# Patient Record
Sex: MALE | Race: WHITE
[De-identification: names, ages, dates, MRNs, and addresses within clinical notes are randomized per-mention and may not be internally consistent; named-entity substitution may affect disease eponyms.]

---

## 2019-06-19 ENCOUNTER — HOSPITAL ENCOUNTER (OUTPATIENT)
Dept: HOSPITAL 43 - DL.SDS | Age: 79
Discharge: HOME | End: 2019-06-19
Attending: OPHTHALMOLOGY
Payer: MEDICARE

## 2019-06-19 DIAGNOSIS — G47.30: ICD-10-CM

## 2019-06-19 DIAGNOSIS — E11.36: Primary | ICD-10-CM

## 2019-06-19 DIAGNOSIS — Z79.82: ICD-10-CM

## 2019-06-19 DIAGNOSIS — E78.5: ICD-10-CM

## 2019-06-19 DIAGNOSIS — Z79.899: ICD-10-CM

## 2019-06-19 DIAGNOSIS — N18.9: ICD-10-CM

## 2019-06-19 DIAGNOSIS — Z87.891: ICD-10-CM

## 2019-06-19 DIAGNOSIS — Z79.84: ICD-10-CM

## 2019-06-19 DIAGNOSIS — E11.22: ICD-10-CM

## 2019-06-19 DIAGNOSIS — I12.9: ICD-10-CM

## 2019-06-19 DIAGNOSIS — J44.9: ICD-10-CM

## 2019-06-19 DIAGNOSIS — E66.9: ICD-10-CM

## 2019-06-19 PROCEDURE — V2632 POST CHMBR INTRAOCULAR LENS: HCPCS

## 2019-06-19 RX ADMIN — SODIUM CHLORIDE ONE MG: 9 INJECTION, SOLUTION INTRAVENOUS at 11:31

## 2019-06-19 RX ADMIN — MOXIFLOXACIN HYDROCHLORIDE ONE DROP: 5 SOLUTION/ DROPS OPHTHALMIC at 10:56

## 2019-06-19 RX ADMIN — DICLOFENAC SODIUM ONE ML: 1 SOLUTION/ DROPS OPHTHALMIC at 11:31

## 2019-06-19 RX ADMIN — Medication ONE ML: at 11:31

## 2019-06-19 RX ADMIN — PHENYLEPHRINE HYDROCHLORIDE ONE DROP: 100 SOLUTION/ DROPS OPHTHALMIC at 10:57

## 2019-06-19 RX ADMIN — POVIDONE-IODINE ONE ML: 5 SOLUTION OPHTHALMIC at 11:31

## 2019-06-19 RX ADMIN — BALANCED SALT SOLUTION ONE ML: 6.4; .75; .48; .3; 3.9; 1.7 SOLUTION OPHTHALMIC at 11:31

## 2019-06-19 RX ADMIN — TIMOLOL MALEATE ONE DROP: 5 SOLUTION OPHTHALMIC at 10:58

## 2019-06-19 RX ADMIN — NEOMYCIN AND POLYMYXIN B SULFATES AND DEXAMETHASONE ONE GM: 3.5; 10000; 1 OINTMENT OPHTHALMIC at 11:31

## 2019-06-19 RX ADMIN — APRACLONIDINE HYDROCHLORIDE ONE ML: 5 SOLUTION/ DROPS OPHTHALMIC at 11:31

## 2019-06-19 RX ADMIN — TETRACAINE HYDROCHLORIDE ONE ML: 5 SOLUTION OPHTHALMIC at 11:31

## 2019-06-19 RX ADMIN — POVIDONE-IODINE ONE ML: 5 SOLUTION OPHTHALMIC at 10:55

## 2019-06-19 RX ADMIN — Medication PRN ML: at 11:01

## 2019-06-19 NOTE — OR
DATE:  06/19/2019

 

PREOPERATIVE DIAGNOSIS:  Visually significant mixed cataract, left eye.

 

POSTOPERATIVE DIAGNOSIS:  Visually significant mixed cataract, left eye.

 

PROCEDURE:  Extracapsular cataract extraction with intraocular lens implant,

left eye.

 

ANESTHESIA:  Topical/local MAC.

 

COMPLICATIONS:  None.

 

INDICATION:  Mr. White was seen in the clinic with complaints of blurred

vision.  His examination revealed visually significant cataract.  I explained

options, I offered cataract surgery, and I explained risks preoperatively

including, but not limited to, infection, retinal detachment, loss of vision,

need for additional surgery, amongst others.  We discussed implant options.  He

has requested a monofocal implant.

 

OPERATIVE DESCRIPTION:  After informed consent was obtained and the risks,

benefits, and alternatives were explained, the patient was brought to the

operative suite and topical anesthesia was administered.  The patient was then

prepped and draped in the sterile fashion and attention was placed on the left

eye.  A sterile lid speculum was placed into the left eye to allow operative

exposure. A full-thickness paracentesis was made in the temporal portion of the

operative eye. Preservative-free lidocaine 0.1 mL was injected into the anterior

chamber followed by viscoelastic. A full-thickness corneal incision was then

made into the anterior chamber.  A bent needle cystotome was used to create a

small nick in the anterior capsule. The capsulorrhexis forceps was then used to

create a 360-degree curvilinear capsulorrhexis.  The nucleus was then removed

using a phacoemulsification handpiece and the remaining cortical material was

then removed with irrigation and aspiration handpiece. Following removal of the

cortical material, the capsular bag was then inspected and noted to be free of

any holes or tears. Viscoelastic was then injected into the capsular bag and the

intraocular lens was inserted into the capsular bag. The viscoelastic material

was then removed from both the anterior and posterior chambers and from behind

the IOL. The lens and capsular bag were then reinspected. The IOL was well

centered and the capsular bag intact. The wound and paracentesis sites were

inspected and hydrated with balanced saline solution. Both were found to be self-

sealing. The intraocular pressure was assessed digitally and found to be within

normal range. A good red reflex was noted at the completion of the procedure. No

complications occurred during the operation. At the completion of the procedure,

Maxitrol, Voltaren, and Iopidine drops were placed into the operative eye. A

sterile eye shield was placed over the operative eye and the patient was

transported to the postoperative recovery area having tolerated the procedure

well. Postoperative instructions were given along with a postoperative

appointment.  The patient was advised to call with any questions or concerns.

 

DD:  06/19/2019 11:42:37

DT:  06/19/2019 13:25:27

[facility]

Job #:  [id number]

## 2019-06-26 ENCOUNTER — HOSPITAL ENCOUNTER (OUTPATIENT)
Dept: HOSPITAL 43 - DL.SDS | Age: 79
Discharge: HOME | End: 2019-06-26
Attending: OPHTHALMOLOGY
Payer: MEDICARE

## 2019-06-26 DIAGNOSIS — N18.9: ICD-10-CM

## 2019-06-26 DIAGNOSIS — Z79.899: ICD-10-CM

## 2019-06-26 DIAGNOSIS — Z96.1: ICD-10-CM

## 2019-06-26 DIAGNOSIS — Z79.82: ICD-10-CM

## 2019-06-26 DIAGNOSIS — E11.36: Primary | ICD-10-CM

## 2019-06-26 DIAGNOSIS — E66.9: ICD-10-CM

## 2019-06-26 DIAGNOSIS — E78.5: ICD-10-CM

## 2019-06-26 DIAGNOSIS — I12.9: ICD-10-CM

## 2019-06-26 DIAGNOSIS — E11.22: ICD-10-CM

## 2019-06-26 DIAGNOSIS — Z87.891: ICD-10-CM

## 2019-06-26 PROCEDURE — C1780 LENS, INTRAOCULAR (NEW TECH): HCPCS

## 2019-06-26 NOTE — OR
DATE:  06/26/2019

 

PREOPERATIVE DIAGNOSIS:  Visually significant mixed cataract, right eye.

 

POSTOPERATIVE DIAGNOSIS:  Visually significant mixed cataract, right eye.

 

PROCEDURE:  Extracapsular cataract extraction with intraocular lens implant,

right eye.

 

ANESTHESIA:  Topical/local MAC.

 

COMPLICATIONS:  None.

 

INDICATION:  Mr. White was seen in the clinic.  He has complained of difficulty

driving, difficulty reading, difficulty seeing road signs.  He also has

difficulty seeing the sights on his rifle.  Examination reveals mixed cataract.

I explained options, offered cataract surgery, and I explained risks including,

but not limited to, infection, retinal detachment, loss of vision, need for

additional surgery, amongst others.  We discussed implant options.  He has

requested surgery with a monofocal implant.

 

OPERATIVE DESCRIPTION:  After informed consent was obtained and the risks,

benefits, and alternatives were explained, the patient was brought to the

operative suite and topical anesthesia was administered.  The patient was then

prepped and draped in the sterile fashion and attention was placed on the right

eye.  A sterile lid speculum was placed into the right eye to allow operative

exposure. A full-thickness paracentesis was made in the temporal portion of the

operative eye. Preservative-free lidocaine 0.1 mL was injected into the anterior

chamber followed by viscoelastic. A full-thickness corneal incision was then

made into the anterior chamber.  A bent needle cystotome was used to create a

small nick in the anterior capsule. The capsulorrhexis forceps was then used to

create a 360-degree curvilinear capsulorrhexis.  The nucleus was then removed

using a phacoemulsification handpiece and the remaining cortical material was

then removed with irrigation and aspiration handpiece. Following removal of the

cortical material, the capsular bag was then inspected and noted to be free of

any holes or tears. Viscoelastic was then injected into the capsular bag and the

intraocular lens was inserted into the capsular bag. The viscoelastic material

was then removed from both the anterior and posterior chambers and from behind

the IOL. The lens and capsular bag were then reinspected. The IOL was well

centered and the capsular bag intact. The wound and paracentesis sites were

inspected and hydrated with balanced saline solution. Both were found to be self-

sealing. The intraocular pressure was assessed digitally and found to be within

normal range. A good red reflex was noted at the completion of the procedure. No

complications occurred during the operation. At the completion of the procedure,

Maxitrol, Voltaren, and Iopidine drops were placed into the operative eye. A

sterile eye shield was placed over the operative eye and the patient was

transported to the postoperative recovery area having tolerated the procedure

well. Postoperative instructions were given along with a postoperative

appointment.  The patient was advised to call with any questions or concerns.

 

DD:  06/26/2019 10:29:03

DT:  06/26/2019 16:19:47

[facility]

Job #:  [id number]